# Patient Record
Sex: MALE | Race: WHITE | NOT HISPANIC OR LATINO
[De-identification: names, ages, dates, MRNs, and addresses within clinical notes are randomized per-mention and may not be internally consistent; named-entity substitution may affect disease eponyms.]

---

## 2021-07-19 PROBLEM — Z00.00 ENCOUNTER FOR PREVENTIVE HEALTH EXAMINATION: Status: ACTIVE | Noted: 2021-07-19

## 2021-08-25 ENCOUNTER — NON-APPOINTMENT (OUTPATIENT)
Age: 61
End: 2021-08-25

## 2021-08-25 ENCOUNTER — LABORATORY RESULT (OUTPATIENT)
Age: 61
End: 2021-08-25

## 2021-08-25 ENCOUNTER — APPOINTMENT (OUTPATIENT)
Dept: TRANSPLANT | Facility: CLINIC | Age: 61
End: 2021-08-25
Payer: COMMERCIAL

## 2021-08-25 ENCOUNTER — APPOINTMENT (OUTPATIENT)
Dept: NEPHROLOGY | Facility: CLINIC | Age: 61
End: 2021-08-25
Payer: COMMERCIAL

## 2021-08-25 VITALS
SYSTOLIC BLOOD PRESSURE: 128 MMHG | OXYGEN SATURATION: 96 % | HEIGHT: 76 IN | DIASTOLIC BLOOD PRESSURE: 80 MMHG | HEART RATE: 78 BPM | BODY MASS INDEX: 28.01 KG/M2 | WEIGHT: 230 LBS | TEMPERATURE: 98 F

## 2021-08-25 PROCEDURE — 99001T: CUSTOM

## 2021-08-25 PROCEDURE — 81382T: CUSTOM

## 2021-08-25 PROCEDURE — 99072 ADDL SUPL MATRL&STAF TM PHE: CPT

## 2021-08-25 PROCEDURE — 99205 OFFICE O/P NEW HI 60 MIN: CPT

## 2021-08-25 PROCEDURE — 86833 HLA CLASS II HIGH DEFIN QUAL: CPT

## 2021-08-25 PROCEDURE — 99204 OFFICE O/P NEW MOD 45 MIN: CPT

## 2021-08-25 PROCEDURE — 86832 HLA CLASS I HIGH DEFIN QUAL: CPT

## 2021-08-25 RX ORDER — SEVELAMER CARBONATE 800 MG/1
800 TABLET, FILM COATED ORAL
Qty: 90 | Refills: 0 | Status: ACTIVE | COMMUNITY
Start: 2021-08-25

## 2021-08-25 RX ORDER — METOPROLOL SUCCINATE 50 MG/1
50 TABLET, EXTENDED RELEASE ORAL
Qty: 60 | Refills: 0 | Status: ACTIVE | COMMUNITY
Start: 2021-08-25

## 2021-08-25 RX ORDER — GABAPENTIN 100 MG/1
100 CAPSULE ORAL
Qty: 30 | Refills: 1 | Status: ACTIVE | COMMUNITY
Start: 2021-08-25

## 2021-08-25 RX ORDER — APIXABAN 5 MG/1
5 TABLET, FILM COATED ORAL
Qty: 60 | Refills: 11 | Status: ACTIVE | COMMUNITY
Start: 2021-08-25

## 2021-08-25 NOTE — CONSULT LETTER
[Dear  ___] : Dear  [unfilled], [Consult Letter:] : I had the pleasure of evaluating your patient, [unfilled]. [Please see my note below.] : Please see my note below. [Sincerely,] : Sincerely, [FreeTextEntry3] : Derick [FreeTextEntry2] : Jim Dill MD

## 2021-08-25 NOTE — HISTORY OF PRESENT ILLNESS
[Diabetes Mellitus] : Diabetes Mellitus [Working] : Working [TextBox_42] : Mr. López is a 61 y.o male with ESRD related to diabetes and Covid19 infection.  His nephrologist is Dr. Dill.  Pt was admitted with Covid 2021.  he was not intubated.  He has fully recovered and has been vaccinated.  \par May have a history of CHF but never hospitalized with pulm edema.  He has a private cardiologist.  He has never had an MI or stent.  \par Pt has had DM2 since .  Pt used to be on insulin but no longer.  He is no longer on any medications.  He also lost a lot of weight (40Lbs) after hospitalization.  Pt has mild retinopathy and cataracts.  Pt has neuropathy of his feet.  \par Pt still urinates about 3-4 times daily.  \par \par Social history:  Pt works in [employer] - on his feet all day.  No alcohol use.  No smoking, used to smoke in his 30's.  Pt is  and has 4 children.  Lives with his wife and 2 children. \par Surgical history: Left wrist AV fistula, Left inguinal hernia repair, cholecystectomy, Dialysis catheter placement\par Family history:  Father:  - age 96 - ESKD, LDKTx (from son, brother of patient) in [place]\par Mother:  - age 84 - lung cancer, smoker.  has 4 children whom are healthy.  Brother donated a kidney.  Sisters have health problems.  \par \par Current Medications:\par Gabapentin \par Eliquis 5mg BID since 2021 (taking for irregular heart beat - ? atrial fibrillation)\par Metoprolol 50 mg BID\par Sevelmer 800 mg 1 TID with meals

## 2021-08-25 NOTE — PHYSICAL EXAM
PAST MEDICAL HISTORY:  Mandibular hyperplasia     Maxillary hypoplasia      [Well Developed] : well developed [Well Nourished] : well nourished [No Acute Distress] : no acute distress [Normocephalic] : normocephalic [Atraumatic] : atraumatic [Sclera Anicteric] : sclera anicteric [Good Dentition] : good dentition [Neck Supple] : neck supple [Clear to Auscultation] : clear to auscultation [Breathing Comfortably on RA] : breathing comfortably on room air [Normal Rate] : normal rate [Regular Rhythm] : regular rhythm [Murmur] : murmur [Systolic Murmur] : systolic murmur [Soft] : soft [Non-tender] : non-tender [In Left Forearm] : fistula/graft in left forearm [] : left dorsalis pedis palpable [Alert] : alert [Responds to Questions Appropriately] : responds to questions appropriately [Oriented] : oriented [Appropriate] : appropriate [FreeTextEntry1] : No carotid bruits [TextBox_34] : No ulcers or edema [TextBox_86] : No adenopathy

## 2021-08-25 NOTE — PLAN
[We should proceed with our protocol for kidney transplantation evaluation.] : We should proceed with our protocol for kidney transplantation evaluation. [TextBox_6] : Chest CT - history of smoking in the past\par CT of aorta and iliacs with IV contrast - diabetic, former smoker

## 2021-08-25 NOTE — CONSULT LETTER
[Dear  ___] : Dear  [unfilled], [Consult Letter:] : I had the pleasure of evaluating your patient, [unfilled]. [Please see my note below.] : Please see my note below. [Consult Closing:] : Thank you very much for allowing me to participate in the care of this patient.  If you have any questions, please do not hesitate to contact me. [Sincerely,] : Sincerely, [FreeTextEntry3] : Catrachito Kowalski, DO

## 2021-08-25 NOTE — PHYSICAL EXAM
[General Appearance - Alert] : alert [General Appearance - In No Acute Distress] : in no acute distress [Sclera] : the sclera and conjunctiva were normal [Extraocular Movements] : extraocular movements were intact [Neck Appearance] : the appearance of the neck was normal [Neck Cervical Mass (___cm)] : no neck mass was observed [Respiration, Rhythm And Depth] : normal respiratory rhythm and effort [Exaggerated Use Of Accessory Muscles For Inspiration] : no accessory muscle use [Auscultation Breath Sounds / Voice Sounds] : lungs were clear to auscultation bilaterally [Heart Rate And Rhythm] : heart rate was normal and rhythm regular [Heart Sounds] : normal S1 and S2 [Heart Sounds Gallop] : no gallops [Murmurs] : no murmurs [Full Pulse] : the pedal pulses are present [Edema] : there was no peripheral edema [Bowel Sounds] : normal bowel sounds [Abdomen Soft] : soft [Abdomen Tenderness] : non-tender [Cervical Lymph Nodes Enlarged Posterior Bilaterally] : posterior cervical [Cervical Lymph Nodes Enlarged Anterior Bilaterally] : anterior cervical [Supraclavicular Lymph Nodes Enlarged Bilaterally] : supraclavicular [Abnormal Walk] : normal gait [Musculoskeletal - Swelling] : no joint swelling seen [Skin Color & Pigmentation] : normal skin color and pigmentation [Skin Turgor] : normal skin turgor [] : no rash [Cranial Nerves] : cranial nerves 2-12 were intact [No Focal Deficits] : no focal deficits [Oriented To Time, Place, And Person] : oriented to person, place, and time [Impaired Insight] : insight and judgment were intact [Affect] : the affect was normal

## 2021-08-25 NOTE — HISTORY OF PRESENT ILLNESS
[Diabetes Mellitus] : Diabetes Mellitus [Other: ___] : [unfilled] [Cardiac History] : cardiac history [Diabetes] : diabetes [Retinopathy] : retinopathy [Neuropathy] : neuropathy [Annual Foot Exams] : annual foot exams [Insulin] : insulin treatment [Working] : Working [Previous Kidney Transplant] : no previous kidney transplant [Blood Transfusion] : no prior blood transfusion [Hx of DVT/Thrombosis/Miscarriage] : no history of dvt/thrombosis/miscarriage [Lower Extremity Ulcers] : no lower extremity ulcers [TextBox_16] : 2021 [TextBox_20] : 2010 [TextBox_24] : 2010 [TextBox_28] : 2000 [TextBox_30] : 1 mile [TextBox_39] : 2010-present [FreeTextEntry3] : 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  Prior to that 1/2 ppd x 24 years\par Drinking: denies\par Potential live donors: will ask

## 2021-08-25 NOTE — REASON FOR VISIT
[Initial] : an initial visit for [Kidney Transplant Evaluation] : kidney transplant evaluation [FreeTextEntry2] : Jim Dill MD

## 2021-08-25 NOTE — PLAN
[FreeTextEntry1] : 1.  ESRD - Mr. López is a reasonable candidate for kidney transplantation.  He will discuss living donation with his sisters and children.  \par 2.  DM2 - currently not on any medications.  Discussed that his blood sugars will rise after transplantation. \par 3.  HTN - stable\par 4.  CV - No cardiac history.  Possible CHF by patient report.  Will need stress test and echocardiogram. \par 5.  Cancer screening - will check/ obtain colonoscopy, PSA, CXR and CT of a/p\par 6.  Hx Covid19 - no sequela.  Has been vaccinated. \par \par I have personally discussed the risks and benefits of transplantation and patient attended transplant education class where the following was disclosed:\par  \par Reviewed factors affecting survival and morbidity while on dialysis, the transplant wait list and reviewed cheryl-operative and long-term risk factors affecting outcome in kidney transplantation.  \par  \par One year SRTR outcomes for national and [facility] were discussed in regards to patient survival and graft survival after transplantation.  \par  \par Details of transplant surgery, including complications were discussed.\par Immunosuppression and complications including infection including life threatening sepsis and opportunistic infections, malignancy and new onset diabetes were discussed.  \par  \par Benefits of live donor transplantation as well as variability in wait times across regions and multiple listing were discussed. \par KDPI >85% and PHS high risk criteria donors were discussed. \par HCV kidney transplantation was discussed.\par  \par Will proceed with completing/ updating work up and listing for transplant/ live donor transplant once work up is reviewed and found to be acceptable by multidisciplinary listing committee.\par \par

## 2021-08-25 NOTE — REVIEW OF SYSTEMS
[Fever] : no fever [Chills] : no chills [Fatigue] : fatigue [Night Sweats] : no night sweats [Recent Weight Gain (___ Lbs)] : no recent weight gain [Recent Weight Loss (___ Lbs)] : recent [unfilled] ~Ulb weight loss [Sore throat] : no sore throat [Pain/Stiffness] : no pain/stiffness [Rhinorrhea] : no rhinorrhea [Trauma] : no trauma [Sclera anicteric] : sclera icteric [Double vision] : no double vision [Blurred Vision] : blurred vision [Eye Pain] : no eye pain [Wears glasses] : wears glasses [Chest Pain] : no chest pain [Palpitations] : no palpitations [Can Walk (___ Blocks)] : can walk [unfilled] blocks [SOB] : no shortness of breath [Wheezing] : no wheezing [Cough] : no cough [Dyspnea on Exertion] : no dyspnea on exertion [Pleuritic Chest Pain] : no pleuritic chest pain [Abdominal Pain] : no abdominal pain [Nausea] : no nausea [Constipation] : no constipation [Diarrhea] : diarrhea [Vomiting] : no vomiting [Dysuria] : no dysuria [Frequency] : no frequency [Urgency] : no urinary urgency [Incontinence] : no incontinence [Hematuria] : no hematuria [UTI] : no UTI [Urine Output: ____] : Urine Output: [unfilled] [Joint Pain] : no joint pain [Joint Stiffness] : no joint stiffness [Muscle Pain] : no muscle pain [Muscle Weakness] : no muscle weakness [Tattoos] : no tattoos [Itching] : no itching [Skin Rash] : skin rash [Hair Changes] : hair changes [Headache] : no headache [Dizziness] : no dizziness [Fainting] : no fainting [Confusion] : no confusion [Memory Loss] : no memory loss [Unsteady Gait] : steady gait [Seizures] : no seizures [Hallucinations] : no hallucinations [Anxiety] : no anxiety [Depression] : no depression [Anemia] : anemia [Adenopathy] : no adenopathy [Easy Bleeding] : no easy bleeding [Easy Bruising] : no easy bruising [de-identified] : cataracts [de-identified] : hair loss

## 2021-08-30 LAB
ABO + RH PNL BLD: NORMAL
ABO + RH PNL BLD: NORMAL
ALBUMIN SERPL ELPH-MCNC: 4.6 G/DL
ALP BLD-CCNC: 251 U/L
ALT SERPL-CCNC: 9 U/L
ANION GAP SERPL CALC-SCNC: 16 MMOL/L
AST SERPL-CCNC: 14 U/L
BASOPHILS # BLD AUTO: 0.03 K/UL
BASOPHILS NFR BLD AUTO: 0.5 %
BILIRUB SERPL-MCNC: 0.2 MG/DL
BUN SERPL-MCNC: 44 MG/DL
CALCIUM SERPL-MCNC: 9.9 MG/DL
CHLORIDE SERPL-SCNC: 100 MMOL/L
CHOLEST SERPL-MCNC: 150 MG/DL
CMV IGG SERPL QL: <0.2 U/ML
CMV IGG SERPL-IMP: NEGATIVE
CO2 SERPL-SCNC: 25 MMOL/L
COVID-19 NUCLEOCAPSID  GAM ANTIBODY INTERPRETATION: POSITIVE
COVID-19 SPIKE DOMAIN ANTIBODY INTERPRETATION: POSITIVE
CREAT SERPL-MCNC: 7.13 MG/DL
EBV DNA SERPL NAA+PROBE-ACNC: NOT DETECTED IU/ML
EBV EA AB SER IA-ACNC: <5 U/ML
EBV EA AB TITR SER IF: POSITIVE
EBV EA IGG SER QL IA: >600 U/ML
EBV EA IGG SER-ACNC: NEGATIVE
EBV EA IGM SER IA-ACNC: NEGATIVE
EBV PATRN SPEC IB-IMP: NORMAL
EBV VCA IGG SER IA-ACNC: >750 U/ML
EBV VCA IGM SER QL IA: <10 U/ML
EOSINOPHIL # BLD AUTO: 0.13 K/UL
EOSINOPHIL NFR BLD AUTO: 2.1 %
EPSTEIN-BARR VIRUS CAPSID ANTIGEN IGG: POSITIVE
ESTIMATED AVERAGE GLUCOSE: 160 MG/DL
GLUCOSE SERPL-MCNC: 110 MG/DL
HAV IGM SER QL: NONREACTIVE
HBA1C MFR BLD HPLC: 7.2 %
HBV CORE IGG+IGM SER QL: NONREACTIVE
HBV SURFACE AB SER QL: NONREACTIVE
HBV SURFACE AB SERPL IA-ACNC: 4.7 MIU/ML
HBV SURFACE AG SER QL: NONREACTIVE
HCT VFR BLD CALC: 39.1 %
HCV AB SER QL: NONREACTIVE
HCV S/CO RATIO: 0.15 S/CO
HDLC SERPL-MCNC: 43 MG/DL
HEPATITIS A IGG ANTIBODY: NONREACTIVE
HGB BLD-MCNC: 11.8 G/DL
HIV1+2 AB SPEC QL IA.RAPID: NONREACTIVE
HSV 1+2 IGG SER IA-IMP: POSITIVE
HSV 1+2 IGG SER IA-IMP: POSITIVE
HSV1 IGG SER QL: 3.12 INDEX
HSV2 IGG SER QL: 6.54 INDEX
IMM GRANULOCYTES NFR BLD AUTO: 0.3 %
LDLC SERPL CALC-MCNC: 71 MG/DL
LYMPHOCYTES # BLD AUTO: 2.02 K/UL
LYMPHOCYTES NFR BLD AUTO: 32.7 %
M TB IFN-G BLD-IMP: NEGATIVE
MAGNESIUM SERPL-MCNC: 2.5 MG/DL
MAN DIFF?: NORMAL
MCHC RBC-ENTMCNC: 22.3 PG
MCHC RBC-ENTMCNC: 30.2 GM/DL
MCV RBC AUTO: 73.8 FL
MONOCYTES # BLD AUTO: 0.59 K/UL
MONOCYTES NFR BLD AUTO: 9.6 %
NEUTROPHILS # BLD AUTO: 3.38 K/UL
NEUTROPHILS NFR BLD AUTO: 54.8 %
NONHDLC SERPL-MCNC: 107 MG/DL
PHOSPHATE SERPL-MCNC: 5.6 MG/DL
PLATELET # BLD AUTO: 315 K/UL
POTASSIUM SERPL-SCNC: 5.5 MMOL/L
PROT SERPL-MCNC: 7.5 G/DL
PSA SERPL-MCNC: 0.9 NG/ML
QUANTIFERON TB PLUS MITOGEN MINUS NIL: 9.16 IU/ML
QUANTIFERON TB PLUS NIL: 0.03 IU/ML
QUANTIFERON TB PLUS TB1 MINUS NIL: 0 IU/ML
QUANTIFERON TB PLUS TB2 MINUS NIL: 0 IU/ML
RBC # BLD: 5.3 M/UL
RBC # FLD: 20.8 %
ROGOSIN: NORMAL
RUBV IGG FLD-ACNC: 4.3 INDEX
RUBV IGG SER-IMP: POSITIVE
SARS-COV-2 AB SERPL IA-ACNC: >250 U/ML
SARS-COV-2 AB SERPL QL IA: 63.4 INDEX
SODIUM SERPL-SCNC: 141 MMOL/L
T GONDII AB SER-IMP: NEGATIVE
T GONDII IGG SER QL: <3 IU/ML
T PALLIDUM AB SER QL IA: NEGATIVE
TRIGL SERPL-MCNC: 183 MG/DL
VZV AB TITR SER: POSITIVE
VZV IGG SER IF-ACNC: 3461 INDEX
WBC # FLD AUTO: 6.17 K/UL

## 2021-10-04 ENCOUNTER — APPOINTMENT (OUTPATIENT)
Dept: CT IMAGING | Facility: CLINIC | Age: 61
End: 2021-10-04
Payer: COMMERCIAL

## 2021-10-04 ENCOUNTER — OUTPATIENT (OUTPATIENT)
Dept: OUTPATIENT SERVICES | Facility: HOSPITAL | Age: 61
LOS: 1 days | End: 2021-10-04
Payer: COMMERCIAL

## 2021-10-04 DIAGNOSIS — Z01.818 ENCOUNTER FOR OTHER PREPROCEDURAL EXAMINATION: ICD-10-CM

## 2021-10-04 PROCEDURE — 74177 CT ABD & PELVIS W/CONTRAST: CPT

## 2021-10-04 PROCEDURE — 74177 CT ABD & PELVIS W/CONTRAST: CPT | Mod: 26

## 2021-10-04 PROCEDURE — 71260 CT THORAX DX C+: CPT | Mod: 26

## 2021-10-04 PROCEDURE — 71260 CT THORAX DX C+: CPT

## 2021-10-06 ENCOUNTER — NON-APPOINTMENT (OUTPATIENT)
Age: 61
End: 2021-10-06

## 2021-10-06 ENCOUNTER — APPOINTMENT (OUTPATIENT)
Dept: CARDIOLOGY | Facility: CLINIC | Age: 61
End: 2021-10-06
Payer: COMMERCIAL

## 2021-10-06 VITALS
WEIGHT: 233 LBS | HEART RATE: 72 BPM | OXYGEN SATURATION: 98 % | DIASTOLIC BLOOD PRESSURE: 78 MMHG | SYSTOLIC BLOOD PRESSURE: 130 MMHG | BODY MASS INDEX: 28.37 KG/M2 | HEIGHT: 76 IN

## 2021-10-06 DIAGNOSIS — Z87.891 PERSONAL HISTORY OF NICOTINE DEPENDENCE: ICD-10-CM

## 2021-10-06 DIAGNOSIS — Z87.898 PERSONAL HISTORY OF OTHER SPECIFIED CONDITIONS: ICD-10-CM

## 2021-10-06 PROCEDURE — 99205 OFFICE O/P NEW HI 60 MIN: CPT

## 2021-10-06 PROCEDURE — 99072 ADDL SUPL MATRL&STAF TM PHE: CPT

## 2021-10-06 PROCEDURE — 93000 ELECTROCARDIOGRAM COMPLETE: CPT | Mod: NC

## 2021-10-15 NOTE — HISTORY OF PRESENT ILLNESS
[FreeTextEntry1] : Patient is a 61 year-old Black gentleman with known history of hypertension, insulin dependent type II diabetes (no longer insulin dependent, since starting HD), ESRD on HD via right radial AV fistula (Mhzzxdz-Nvxcyrjk-Fjtrrcpi) since January 2021, when he was hospitalized with Covid-19 infection, paroxysmal atrial fibrillation, maintained on Eliquis 5 mg BID, presents today for cardiac evaluation prior to possible renal transplant.\par After being hospitalized with Covid-19, he has received both doses of Moderna's Covid-19 vaccine.\par \par Patient does not exercise, but he hopes to start.\par \par He reports diabetic neuropathy in the feet.\par He does not have symptoms of carpal tunnel syndrome.\par \par

## 2021-10-15 NOTE — PHYSICAL EXAM
[Well Developed] : well developed [Well Nourished] : well nourished [No Acute Distress] : no acute distress [Normal Conjunctiva] : normal conjunctiva [Normal Venous Pressure] : normal venous pressure [No Carotid Bruit] : no carotid bruit [Normal S1, S2] : normal S1, S2 [No Murmur] : no murmur [No Rub] : no rub [No Gallop] : no gallop [Clear Lung Fields] : clear lung fields [Good Air Entry] : good air entry [No Respiratory Distress] : no respiratory distress  [Soft] : abdomen soft [Non Tender] : non-tender [No Masses/organomegaly] : no masses/organomegaly [Normal Bowel Sounds] : normal bowel sounds [Normal Gait] : normal gait [No Edema] : no edema [No Cyanosis] : no cyanosis [No Clubbing] : no clubbing [No Varicosities] : no varicosities [No Rash] : no rash [No Skin Lesions] : no skin lesions [Moves all extremities] : moves all extremities [No Focal Deficits] : no focal deficits [Normal Speech] : normal speech [Alert and Oriented] : alert and oriented [Normal memory] : normal memory [de-identified] : right radial AV fistula, +thrill

## 2021-10-15 NOTE — CARDIOLOGY SUMMARY
[de-identified] : 10/6/2021, sinus 69 bpm, first degree AV delay (aSllie 238 msec), nonspecific IVCD, early repolarization

## 2021-10-15 NOTE — DISCUSSION/SUMMARY
[FreeTextEntry1] : Patient is a 61 year-old Black gentleman with history as above who is being evaluated for renal transplant.\par \par Echocardiogram to evaluate for structural heart disease.\par Nuclear stress test to evaluate for ischemic heart disease.\par \par Encouraged walking or using his son's bicycle for exercise.

## 2021-10-18 ENCOUNTER — APPOINTMENT (OUTPATIENT)
Dept: CARDIOLOGY | Facility: CLINIC | Age: 61
End: 2021-10-18
Payer: COMMERCIAL

## 2021-10-18 PROCEDURE — A9500: CPT

## 2021-10-18 PROCEDURE — 99072 ADDL SUPL MATRL&STAF TM PHE: CPT

## 2021-10-18 PROCEDURE — 93015 CV STRESS TEST SUPVJ I&R: CPT

## 2021-10-18 PROCEDURE — 78452 HT MUSCLE IMAGE SPECT MULT: CPT

## 2021-10-18 PROCEDURE — 93306 TTE W/DOPPLER COMPLETE: CPT

## 2021-10-26 ENCOUNTER — NON-APPOINTMENT (OUTPATIENT)
Age: 61
End: 2021-10-26

## 2022-01-06 ENCOUNTER — NON-APPOINTMENT (OUTPATIENT)
Age: 62
End: 2022-01-06

## 2022-02-16 ENCOUNTER — APPOINTMENT (OUTPATIENT)
Dept: TRANSPLANT | Facility: CLINIC | Age: 62
End: 2022-02-16
Payer: COMMERCIAL

## 2022-02-16 VITALS
TEMPERATURE: 97.6 F | HEART RATE: 84 BPM | BODY MASS INDEX: 27.76 KG/M2 | SYSTOLIC BLOOD PRESSURE: 130 MMHG | OXYGEN SATURATION: 97 % | DIASTOLIC BLOOD PRESSURE: 76 MMHG | WEIGHT: 228 LBS | HEIGHT: 76 IN | RESPIRATION RATE: 14 BRPM

## 2022-02-16 PROCEDURE — 99215 OFFICE O/P EST HI 40 MIN: CPT

## 2022-02-16 PROCEDURE — 99072 ADDL SUPL MATRL&STAF TM PHE: CPT

## 2022-02-16 NOTE — PHYSICAL EXAM
[Well Developed] : well developed [Well Nourished] : well nourished [No Acute Distress] : no acute distress [Normocephalic] : normocephalic [Atraumatic] : atraumatic [Sclera Anicteric] : sclera anicteric [Good Dentition] : good dentition [Neck Supple] : neck supple [Clear to Auscultation] : clear to auscultation [Breathing Comfortably on RA] : breathing comfortably on room air [Normal Rate] : normal rate [Regular Rhythm] : regular rhythm [Murmur] : murmur [Systolic Murmur] : systolic murmur [Soft] : soft [Non-tender] : non-tender [] : right dorsalis pedis palpable [Alert] : alert [Responds to Questions Appropriately] : responds to questions appropriately [Oriented] : oriented [Appropriate] : appropriate [In Right Forearm] : fistula/graft in right forearm [FreeTextEntry1] : No carotid bruits [TextBox_34] : No ulcers or edema [TextBox_86] : No adenopathy

## 2022-02-16 NOTE — REASON FOR VISIT
[Follow-Up] : a follow-up visit for [Kidney Transplant Evaluation] : kidney transplant evaluation [FreeTextEntry2] : Jim Dill MD

## 2022-02-16 NOTE — REVIEW OF SYSTEMS
[Recent Weight Loss (___ Lbs)] : recent [unfilled] ~Ulb weight loss [Blurred Vision] : blurred vision [Wears glasses] : wears glasses [Can Walk (___ Blocks)] : can walk [unfilled] blocks [Urine Output: ____] : Urine Output: [unfilled] [Anemia] : anemia [Fever] : no fever [Chills] : no chills [Fatigue] : no fatigue [Night Sweats] : no night sweats [Recent Weight Gain (___ Lbs)] : no recent weight gain [Sore throat] : no sore throat [Pain/Stiffness] : no pain/stiffness [Rhinorrhea] : no rhinorrhea [Trauma] : no trauma [Sclera anicteric] : sclera icteric [Double vision] : no double vision [Eye Pain] : no eye pain [Chest Pain] : no chest pain [Palpitations] : no palpitations [SOB] : no shortness of breath [Wheezing] : no wheezing [Cough] : no cough [Dyspnea on Exertion] : no dyspnea on exertion [Pleuritic Chest Pain] : no pleuritic chest pain [Abdominal Pain] : no abdominal pain [Nausea] : no nausea [Constipation] : no constipation [Diarrhea] : diarrhea [Vomiting] : no vomiting [Dysuria] : no dysuria [Frequency] : no frequency [Urgency] : no urinary urgency [Incontinence] : no incontinence [Hematuria] : no hematuria [UTI] : no UTI [Joint Pain] : no joint pain [Joint Stiffness] : no joint stiffness [Muscle Pain] : no muscle pain [Muscle Weakness] : no muscle weakness [Tattoos] : no tattoos [Itching] : no itching [Skin Rash] : no skin rash [Hair Changes] : no hair changes [Headache] : no headache [Dizziness] : no dizziness [Fainting] : no fainting [Confusion] : no confusion [Memory Loss] : no memory loss [Unsteady Gait] : steady gait [Seizures] : no seizures [Hallucinations] : no hallucinations [Anxiety] : no anxiety [Depression] : no depression [Adenopathy] : no adenopathy [Easy Bleeding] : no easy bleeding [Easy Bruising] : no easy bruising [de-identified] : cataracts

## 2022-02-16 NOTE — HISTORY OF PRESENT ILLNESS
[TextBox_42] : Cause of Kidney Failure: Diabetes Mellitus, COVID \par Date Dialysis Started:  \par Date of Kidney Failure Diagnosis:  \par Date of Hypertension Diagnosis:  \par Date of Diabetes Diagnosis:  \par Medical History: cardiac history, diabetes, but no previous kidney transplant, no prior blood transfusion, no history of dvt/thrombosis/miscarriage \par Distance Able to Walk: 1 mile. 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 Prior to that 1/2 ppd x 24 years\par Drinking: denies\par Potential live donors: none at present \par

## 2022-02-16 NOTE — CONSULT LETTER
[Dear  ___] : Dear  [unfilled], [Consult Letter:] : I had the pleasure of evaluating your patient, [unfilled]. [Please see my note below.] : Please see my note below. [Consult Closing:] : Thank you very much for allowing me to participate in the care of this patient.  If you have any questions, please do not hesitate to contact me. [Sincerely,] : Sincerely, [FreeTextEntry2] : Jim Dill MD [FreeTextEntry3] : Derick

## 2022-02-17 LAB
COVID-19 SPIKE DOMAIN ANTIBODY INTERPRETATION: POSITIVE
SARS-COV-2 AB SERPL IA-ACNC: >250 U/ML
SARS-COV-2 N GENE NPH QL NAA+PROBE: NOT DETECTED

## 2022-02-18 ENCOUNTER — NON-APPOINTMENT (OUTPATIENT)
Age: 62
End: 2022-02-18

## 2022-05-17 ENCOUNTER — NON-APPOINTMENT (OUTPATIENT)
Age: 62
End: 2022-05-17

## 2022-09-14 ENCOUNTER — LABORATORY RESULT (OUTPATIENT)
Age: 62
End: 2022-09-14

## 2022-09-14 ENCOUNTER — APPOINTMENT (OUTPATIENT)
Dept: NEPHROLOGY | Facility: CLINIC | Age: 62
End: 2022-09-14

## 2022-09-14 VITALS
RESPIRATION RATE: 14 BRPM | HEIGHT: 76 IN | TEMPERATURE: 98 F | WEIGHT: 232 LBS | SYSTOLIC BLOOD PRESSURE: 126 MMHG | HEART RATE: 87 BPM | BODY MASS INDEX: 28.25 KG/M2 | DIASTOLIC BLOOD PRESSURE: 79 MMHG | OXYGEN SATURATION: 99 %

## 2022-09-14 PROCEDURE — 99215 OFFICE O/P EST HI 40 MIN: CPT

## 2022-09-14 PROCEDURE — 99072 ADDL SUPL MATRL&STAF TM PHE: CPT

## 2022-09-14 NOTE — HISTORY OF PRESENT ILLNESS
[Diabetes Mellitus] : Diabetes Mellitus [Working] : Working [TextBox_42] : Mr. López is a 62 year old  male with ESRD related to diabetes and Covid19 infection. \par  His nephrologist is Dr. Dill. \par \par Pt was admitted with Covid 2021.  he was not intubated.  He has fully recovered and has been vaccinated.  \par No h/o MI or CAD\par Pt has had DM2 since .  Pt used to be on insulin but no longer.  He is no longer on any medications.  He also lost a lot of weight (40Lbs) after hospitalization.  Pt has mild retinopathy and cataracts.  Pt has neuropathy of his feet.  \par Pt still urinates about 3-4 times daily.  \par \par Social history:  Pt works in [employer] - on his feet all day.  No alcohol use.  No smoking, used to smoke in his 30's.  Pt is  and has 4 children.  Lives with his wife and 2 children. \par Surgical history: Left wrist AV fistula, Left inguinal hernia repair, cholecystectomy, Dialysis catheter placement\par Family history:  Father:  - age 96 - ESKD, LDKTx (from son, brother of patient) in [place]\par Mother:  - age 84 - lung cancer, smoker.  has 4 children whom are healthy.  Brother donated a kidney.  Sisters have health problems.  \par \par Current Medications:\par Gabapentin \par Eliquis 5mg BID since 2021 (taking for irregular heart beat - ? atrial fibrillation)\par Metoprolol 50 mg BID\par Sevelmer 800 mg 1 TID with meals\par \par \par Last Seen 2022\par Listed 22\par Dialysis \par ABO A\par PRA 29% will repeat today. \par \par Most recent testing.\par Cardiac- followed by Dr Carlton. \par On 10/18/2021, patient had his echo and nuclear stress test.\par Echo showed a dilated aortic root at 4.4 cm, but he is 6'4" tall. He was seen to have normal LV systolic function, LVEF 55-60%.\par During his nuclear stress test, he completed 5 minutes 30 seconds of Wili protocol (>6 METS), achieved 86% MPHR, and the myocardial perfusion imaging was negative for ischemia. \par \par No further cardiovascular testing is necessary prior to consideration for renal transplant. \par \par Radiology\par CT chest/abd/and pelvis with IV contrast 10/26/21 patent centra airways. thoracic aorta measureing 4.1 cm, cholecystectomy. atrophic kidneys. no hydro bilaterally. prostatomegaly 6.5 cm. other organs unremarkable. vasculature noted mild atherosclerotic change. patent abd arteries. mild calcifiev and noncalcified plaque of the bilateral common iliac and internal iliac arteries. \par \par Cancer Screening\par Colonoscopy 2022 internal hemorrhoids diverticulosis of colon. no gross lesions identified. \par PSA 21 0.9 \par \par No hospital admissions in this past one year. \par \par

## 2022-09-14 NOTE — PLAN
[FreeTextEntry1] : 1.  ESRD - Mr. López is a reasonable candidate for kidney transplantation.  \par 2.  DM2 - currently not on any medications. Discussed that his blood sugars will rise after transplantation. \par 3.  HTN - stable\par 4.  CV - needs an updated stress test and echocardiogram. \par 5.  Cancer screening -  colonoscopy was wnl. check  PSA\par 5. Imaging- update\par \par \par I have personally discussed the risks and benefits of transplantation and patient attended transplant education class where the following was disclosed:\par  \par Reviewed factors affecting survival and morbidity while on dialysis, the transplant wait list and reviewed cheryl-operative and long-term risk factors affecting outcome in kidney transplantation.  \par  \par One year SRTR outcomes for national and [facility] were discussed in regards to patient survival and graft survival after transplantation.  \par  \par Details of transplant surgery, including complications were discussed.\par Immunosuppression and complications including infection including life threatening sepsis and opportunistic infections, malignancy and new onset diabetes were discussed.  \par  \par Benefits of live donor transplantation as well as variability in wait times across regions and multiple listing were discussed. \par KDPI >85% and PHS high risk criteria donors were discussed. \par HCV kidney transplantation was discussed.\par  \par Will proceed with completing/ updating work up and listing for transplant/ live donor transplant once work up is reviewed and found to be acceptable by multidisciplinary listing committee.\par \par

## 2022-09-23 LAB
ABO + RH PNL BLD: NORMAL
ALBUMIN SERPL ELPH-MCNC: 4.9 G/DL
ALP BLD-CCNC: 239 U/L
ALT SERPL-CCNC: 9 U/L
ANION GAP SERPL CALC-SCNC: 23 MMOL/L
APPEARANCE: CLEAR
AST SERPL-CCNC: 11 U/L
BASOPHILS # BLD AUTO: 0.05 K/UL
BASOPHILS NFR BLD AUTO: 0.6 %
BILIRUB SERPL-MCNC: 0.3 MG/DL
BILIRUBIN URINE: NEGATIVE
BLOOD URINE: NORMAL
BUN SERPL-MCNC: 45 MG/DL
C PEPTIDE SERPL-MCNC: 11.8 NG/ML
CALCIUM SERPL-MCNC: 9.9 MG/DL
CHLORIDE SERPL-SCNC: 100 MMOL/L
CHOLEST SERPL-MCNC: 176 MG/DL
CK SERPL-CCNC: 100 U/L
CMV IGG SERPL QL: <0.2 U/ML
CMV IGG SERPL-IMP: NEGATIVE
CO2 SERPL-SCNC: 22 MMOL/L
COLOR: YELLOW
COVID-19 SPIKE DOMAIN ANTIBODY INTERPRETATION: POSITIVE
CREAT SERPL-MCNC: 7.82 MG/DL
CREAT SPEC-SCNC: 208 MG/DL
CREAT/PROT UR: 2.2 RATIO
CRP SERPL-MCNC: 4 MG/L
EBV EA AB SER IA-ACNC: <5 U/ML
EBV EA AB TITR SER IF: POSITIVE
EBV EA IGG SER QL IA: >600 U/ML
EBV EA IGG SER-ACNC: NEGATIVE
EBV EA IGM SER IA-ACNC: NEGATIVE
EBV PATRN SPEC IB-IMP: NORMAL
EBV VCA IGG SER IA-ACNC: >750 U/ML
EBV VCA IGM SER QL IA: <10 U/ML
EGFR: 7 ML/MIN/1.73M2
EOSINOPHIL # BLD AUTO: 0.26 K/UL
EOSINOPHIL NFR BLD AUTO: 3.3 %
EPSTEIN-BARR VIRUS CAPSID ANTIGEN IGG: POSITIVE
ERYTHROCYTE [SEDIMENTATION RATE] IN BLOOD BY WESTERGREN METHOD: 72 MM/HR
ESTIMATED AVERAGE GLUCOSE: 166 MG/DL
GLUCOSE QUALITATIVE U: ABNORMAL
GLUCOSE SERPL-MCNC: 244 MG/DL
HAV IGM SER QL: NONREACTIVE
HBA1C MFR BLD HPLC: 7.4 %
HBV CORE IGG+IGM SER QL: NONREACTIVE
HBV SURFACE AB SER QL: REACTIVE
HBV SURFACE AB SERPL IA-ACNC: 658.4 MIU/ML
HBV SURFACE AG SER QL: NONREACTIVE
HCT VFR BLD CALC: 36.2 %
HCV AB SER QL: NONREACTIVE
HCV S/CO RATIO: 0.13 S/CO
HDLC SERPL-MCNC: 40 MG/DL
HEPATITIS A IGG ANTIBODY: NONREACTIVE
HGB BLD-MCNC: 11.2 G/DL
HIV1+2 AB SPEC QL IA.RAPID: NONREACTIVE
HSV 1+2 IGG SER IA-IMP: POSITIVE
HSV1 IGG SER QL: 2.52 INDEX
HSV1 IGG SER QL: 2.52 INDEX
HSV1 IGM SER QL: NEGATIVE
HSV2 AB FLD-ACNC: NEGATIVE
HSV2 IGG SER QL: 5.31 INDEX
IMM GRANULOCYTES NFR BLD AUTO: 0.4 %
KETONES URINE: NEGATIVE
LDLC SERPL CALC-MCNC: 97 MG/DL
LEUKOCYTE ESTERASE URINE: NEGATIVE
LYMPHOCYTES # BLD AUTO: 2.27 K/UL
LYMPHOCYTES NFR BLD AUTO: 28.9 %
M TB IFN-G BLD-IMP: NEGATIVE
MAGNESIUM SERPL-MCNC: 2.2 MG/DL
MAN DIFF?: NORMAL
MCHC RBC-ENTMCNC: 22.4 PG
MCHC RBC-ENTMCNC: 30.9 GM/DL
MCV RBC AUTO: 72.5 FL
MONOCYTES # BLD AUTO: 0.58 K/UL
MONOCYTES NFR BLD AUTO: 7.4 %
NEUTROPHILS # BLD AUTO: 4.67 K/UL
NEUTROPHILS NFR BLD AUTO: 59.4 %
NITRITE URINE: NEGATIVE
NONHDLC SERPL-MCNC: 136 MG/DL
PH URINE: 6.5
PHOSPHATE SERPL-MCNC: 5.1 MG/DL
PLATELET # BLD AUTO: 280 K/UL
POTASSIUM SERPL-SCNC: 4.5 MMOL/L
PROT SERPL-MCNC: 7.6 G/DL
PROT UR-MCNC: 455 MG/DL
PROTEIN URINE: ABNORMAL
PSA SERPL-MCNC: 1.16 NG/ML
QUANTIFERON TB PLUS MITOGEN MINUS NIL: >10 IU/ML
QUANTIFERON TB PLUS NIL: 0.03 IU/ML
QUANTIFERON TB PLUS TB1 MINUS NIL: 0.02 IU/ML
QUANTIFERON TB PLUS TB2 MINUS NIL: 0 IU/ML
RBC # BLD: 4.99 M/UL
RBC # FLD: 19.3 %
RUBV IGG FLD-ACNC: 2.7 INDEX
RUBV IGG SER-IMP: POSITIVE
SARS-COV-2 AB SERPL IA-ACNC: >250 U/ML
SARS-COV-2 N GENE NPH QL NAA+PROBE: NOT DETECTED
SODIUM SERPL-SCNC: 144 MMOL/L
SPECIFIC GRAVITY URINE: 1.02
T GONDII AB SER-IMP: NEGATIVE
T GONDII IGG SER QL: <3 IU/ML
T PALLIDUM AB SER QL IA: NEGATIVE
T3 SERPL-MCNC: 126 NG/DL
T4 FREE SERPL-MCNC: 1 NG/DL
TRIGL SERPL-MCNC: 195 MG/DL
TSH SERPL-ACNC: 2.82 UIU/ML
URATE SERPL-MCNC: 4.8 MG/DL
UROBILINOGEN URINE: NORMAL
VZV AB TITR SER: POSITIVE
VZV IGG SER IF-ACNC: 1386 INDEX
WBC # FLD AUTO: 7.86 K/UL

## 2022-10-20 ENCOUNTER — APPOINTMENT (OUTPATIENT)
Dept: CARDIOLOGY | Facility: CLINIC | Age: 62
End: 2022-10-20

## 2022-10-20 ENCOUNTER — NON-APPOINTMENT (OUTPATIENT)
Age: 62
End: 2022-10-20

## 2022-10-20 VITALS
WEIGHT: 236 LBS | OXYGEN SATURATION: 97 % | DIASTOLIC BLOOD PRESSURE: 82 MMHG | SYSTOLIC BLOOD PRESSURE: 132 MMHG | HEIGHT: 76 IN | HEART RATE: 73 BPM | BODY MASS INDEX: 28.74 KG/M2

## 2022-10-20 PROCEDURE — 99215 OFFICE O/P EST HI 40 MIN: CPT

## 2022-10-20 PROCEDURE — 99072 ADDL SUPL MATRL&STAF TM PHE: CPT

## 2022-10-20 PROCEDURE — 93000 ELECTROCARDIOGRAM COMPLETE: CPT | Mod: NC

## 2022-10-21 NOTE — PHYSICAL EXAM
[Well Developed] : well developed [Well Nourished] : well nourished [No Acute Distress] : no acute distress [Normal Conjunctiva] : normal conjunctiva [Normal Venous Pressure] : normal venous pressure [No Carotid Bruit] : no carotid bruit [Normal S1, S2] : normal S1, S2 [No Murmur] : no murmur [No Rub] : no rub [No Gallop] : no gallop [Clear Lung Fields] : clear lung fields [Good Air Entry] : good air entry [No Respiratory Distress] : no respiratory distress  [Soft] : abdomen soft [Non Tender] : non-tender [No Masses/organomegaly] : no masses/organomegaly [Normal Bowel Sounds] : normal bowel sounds [Normal Gait] : normal gait [No Edema] : no edema [No Cyanosis] : no cyanosis [No Clubbing] : no clubbing [No Varicosities] : no varicosities [No Rash] : no rash [No Skin Lesions] : no skin lesions [Moves all extremities] : moves all extremities [No Focal Deficits] : no focal deficits [Normal Speech] : normal speech [Alert and Oriented] : alert and oriented [Normal memory] : normal memory [de-identified] : right radial AV fistula, +thrill

## 2022-10-21 NOTE — HISTORY OF PRESENT ILLNESS
[FreeTextEntry1] : Patient is a 61 year-old Black gentleman with known history of hypertension, insulin dependent type II diabetes (no longer insulin dependent, since starting HD), ESRD on HD via right radial AV fistula (Vinqaje-Phtivuke-Zgypeemh) since January 2021, when he was hospitalized with Covid-19 infection, paroxysmal atrial fibrillation, maintained on Eliquis 5 mg BID, presents today for cardiac evaluation prior to possible renal transplant.\par After being hospitalized with Covid-19, he has received both doses of Moderna's Covid-19 vaccine.\par \par Patient does not exercise, but he hopes to start.\par \par He reports diabetic neuropathy in the feet.\par He does not have symptoms of carpal tunnel syndrome.\par \par

## 2022-10-21 NOTE — DISCUSSION/SUMMARY
[EKG obtained to assist in diagnosis and management of assessed problem(s)] : EKG obtained to assist in diagnosis and management of assessed problem(s) [FreeTextEntry1] : Patient is a 62 year-old Black gentleman with history as above who is being evaluated for renal transplant.\par Echocardiogram and nuclear stress testing in October 2021 were within normal limits and negative for ischemic heart disease. Dilated aortic root was noted, but it may be normal when adjusted for his height. \par \par Echocardiogram to evaluate for structural heart disease.\par Nuclear stress test to evaluate for ischemic heart disease.\par \par Encouraged walking or using his son's bicycle for exercise.

## 2022-10-21 NOTE — REASON FOR VISIT
[Other: ____] : [unfilled] [FreeTextEntry1] : October 2022 - Patient returns today for follow-up in his usual state of health. He reports feeling tired all the time. He attributes this to taking his daughter to her job (at [email or web address]) at 5 in the morning, and then taking his wife to work at 9am).\par Continues to have HD via right radial AV fistula (Hftijin-Rdniwcxm-Qiezvciv).\par He does not exercise. \par He has not had Covid-19 infection again since his January 2021 infection. He received two vaccines and a booster, but he has not received the bivalent booster.

## 2022-10-21 NOTE — CARDIOLOGY SUMMARY
[de-identified] : 10/6/2021, sinus 69 bpm, first degree AV delay (Sallie 238 msec), nonspecific IVCD, early repolarization [de-identified] : 10/18/2021, 5 minutes 30 seconds of Wili protocol (6.5 METS), 85% MPHR, normal myocardial perfusion imaging [de-identified] : 10/18/2021, dilated aortic root at 4.4 cm dilated LA, normal RV size and function, normal LV systolic function, LVEF 55-60%

## 2022-11-21 ENCOUNTER — APPOINTMENT (OUTPATIENT)
Dept: CARDIOLOGY | Facility: CLINIC | Age: 62
End: 2022-11-21

## 2022-11-21 PROCEDURE — A9500: CPT

## 2022-11-21 PROCEDURE — 78452 HT MUSCLE IMAGE SPECT MULT: CPT

## 2022-11-21 PROCEDURE — 99072 ADDL SUPL MATRL&STAF TM PHE: CPT

## 2022-11-21 PROCEDURE — 93306 TTE W/DOPPLER COMPLETE: CPT

## 2022-11-21 PROCEDURE — 93015 CV STRESS TEST SUPVJ I&R: CPT

## 2023-07-13 ENCOUNTER — NON-APPOINTMENT (OUTPATIENT)
Age: 63
End: 2023-07-13

## 2023-09-14 ENCOUNTER — NON-APPOINTMENT (OUTPATIENT)
Age: 63
End: 2023-09-14

## 2023-09-15 ENCOUNTER — APPOINTMENT (OUTPATIENT)
Dept: NEPHROLOGY | Facility: CLINIC | Age: 63
End: 2023-09-15
Payer: COMMERCIAL

## 2023-09-15 ENCOUNTER — LABORATORY RESULT (OUTPATIENT)
Age: 63
End: 2023-09-15

## 2023-09-15 VITALS
OXYGEN SATURATION: 98 % | HEART RATE: 61 BPM | SYSTOLIC BLOOD PRESSURE: 160 MMHG | BODY MASS INDEX: 28.01 KG/M2 | RESPIRATION RATE: 14 BRPM | HEIGHT: 76 IN | DIASTOLIC BLOOD PRESSURE: 88 MMHG | WEIGHT: 230 LBS | TEMPERATURE: 97.3 F

## 2023-09-15 DIAGNOSIS — I10 ESSENTIAL (PRIMARY) HYPERTENSION: ICD-10-CM

## 2023-09-15 DIAGNOSIS — N18.6 END STAGE RENAL DISEASE: ICD-10-CM

## 2023-09-15 DIAGNOSIS — Z99.2 END STAGE RENAL DISEASE: ICD-10-CM

## 2023-09-15 LAB
ABO + RH PNL BLD: NORMAL
ALBUMIN SERPL ELPH-MCNC: 4 G/DL
ALP BLD-CCNC: 546 U/L
ALT SERPL-CCNC: 6 U/L
ANION GAP SERPL CALC-SCNC: 14 MMOL/L
APPEARANCE: CLEAR
AST SERPL-CCNC: 9 U/L
BILIRUB SERPL-MCNC: 0.3 MG/DL
BILIRUBIN URINE: NEGATIVE
BLOOD URINE: ABNORMAL
BUN SERPL-MCNC: 23 MG/DL
C PEPTIDE SERPL-MCNC: 7.2 NG/ML
CALCIUM SERPL-MCNC: 8.2 MG/DL
CHLORIDE SERPL-SCNC: 101 MMOL/L
CHOLEST SERPL-MCNC: 170 MG/DL
CK SERPL-CCNC: 122 U/L
CO2 SERPL-SCNC: 26 MMOL/L
COLOR: YELLOW
COVID-19 SPIKE DOMAIN ANTIBODY INTERPRETATION: POSITIVE
CREAT SERPL-MCNC: 4.42 MG/DL
CRP SERPL-MCNC: 9 MG/L
EGFR: 14 ML/MIN/1.73M2
ERYTHROCYTE [SEDIMENTATION RATE] IN BLOOD BY WESTERGREN METHOD: 55 MM/HR
GLUCOSE QUALITATIVE U: >=1000 MG/DL
GLUCOSE SERPL-MCNC: 255 MG/DL
HDLC SERPL-MCNC: 38 MG/DL
HIV1+2 AB SPEC QL IA.RAPID: NONREACTIVE
KETONES URINE: NEGATIVE MG/DL
LDLC SERPL CALC-MCNC: 98 MG/DL
LEUKOCYTE ESTERASE URINE: NEGATIVE
MAGNESIUM SERPL-MCNC: 2 MG/DL
NITRITE URINE: NEGATIVE
NONHDLC SERPL-MCNC: 132 MG/DL
PH URINE: 7
PHOSPHATE SERPL-MCNC: 4.2 MG/DL
POTASSIUM SERPL-SCNC: 3.9 MMOL/L
PROT SERPL-MCNC: 7 G/DL
PROTEIN URINE: 300 MG/DL
PSA SERPL-MCNC: 1.08 NG/ML
SARS-COV-2 AB SERPL IA-ACNC: >250 U/ML
SODIUM SERPL-SCNC: 141 MMOL/L
SPECIFIC GRAVITY URINE: 1.02
T3 SERPL-MCNC: 98 NG/DL
T4 FREE SERPL-MCNC: 1 NG/DL
TRIGL SERPL-MCNC: 199 MG/DL
TSH SERPL-ACNC: 2.11 UIU/ML
URATE SERPL-MCNC: 3.8 MG/DL
UROBILINOGEN URINE: 1 MG/DL

## 2023-09-15 PROCEDURE — 99213 OFFICE O/P EST LOW 20 MIN: CPT

## 2023-09-22 LAB
ESTIMATED AVERAGE GLUCOSE: >398 MG/DL
HAV IGM SER QL: NONREACTIVE
HBA1C MFR BLD HPLC: >15.5 %
HBV CORE IGG+IGM SER QL: NONREACTIVE
HBV SURFACE AB SER QL: REACTIVE
HBV SURFACE AB SERPL IA-ACNC: 136.1 MIU/ML
HBV SURFACE AG SER QL: NONREACTIVE
HCV AB SER QL: NONREACTIVE
HCV S/CO RATIO: 0.08 S/CO
HSV 1+2 IGG SER IA-IMP: POSITIVE
HSV 1+2 IGG SER IA-IMP: POSITIVE
HSV1 IGG SER QL: 1.81 INDEX
HSV2 IGG SER QL: 5.34 INDEX
M TB IFN-G BLD-IMP: NEGATIVE
QUANTIFERON TB PLUS MITOGEN MINUS NIL: 8.49 IU/ML
QUANTIFERON TB PLUS NIL: 0.01 IU/ML
QUANTIFERON TB PLUS TB1 MINUS NIL: 0 IU/ML
QUANTIFERON TB PLUS TB2 MINUS NIL: 0 IU/ML
RUBV IGG FLD-ACNC: 1.9 INDEX
RUBV IGG SER-IMP: POSITIVE
SARS-COV-2 N GENE NPH QL NAA+PROBE: NOT DETECTED
T PALLIDUM AB SER QL IA: NEGATIVE
VZV AB TITR SER: POSITIVE
VZV IGG SER IF-ACNC: 946.2 INDEX

## 2023-10-06 ENCOUNTER — APPOINTMENT (OUTPATIENT)
Dept: RADIOLOGY | Facility: CLINIC | Age: 63
End: 2023-10-06
Payer: COMMERCIAL

## 2023-10-06 ENCOUNTER — APPOINTMENT (OUTPATIENT)
Dept: CT IMAGING | Facility: CLINIC | Age: 63
End: 2023-10-06
Payer: COMMERCIAL

## 2023-10-06 ENCOUNTER — OUTPATIENT (OUTPATIENT)
Dept: OUTPATIENT SERVICES | Facility: HOSPITAL | Age: 63
LOS: 1 days | End: 2023-10-06
Payer: COMMERCIAL

## 2023-10-06 DIAGNOSIS — Z00.8 ENCOUNTER FOR OTHER GENERAL EXAMINATION: ICD-10-CM

## 2023-10-06 PROCEDURE — 71046 X-RAY EXAM CHEST 2 VIEWS: CPT | Mod: 26

## 2023-10-06 PROCEDURE — 74178 CT ABD&PLV WO CNTR FLWD CNTR: CPT | Mod: 26

## 2023-10-06 PROCEDURE — 71046 X-RAY EXAM CHEST 2 VIEWS: CPT

## 2023-10-06 PROCEDURE — 74178 CT ABD&PLV WO CNTR FLWD CNTR: CPT

## 2023-11-15 ENCOUNTER — APPOINTMENT (OUTPATIENT)
Dept: CARDIOLOGY | Facility: CLINIC | Age: 63
End: 2023-11-15
Payer: COMMERCIAL

## 2023-11-15 ENCOUNTER — NON-APPOINTMENT (OUTPATIENT)
Age: 63
End: 2023-11-15

## 2023-11-15 VITALS
OXYGEN SATURATION: 100 % | SYSTOLIC BLOOD PRESSURE: 162 MMHG | DIASTOLIC BLOOD PRESSURE: 84 MMHG | HEART RATE: 65 BPM | BODY MASS INDEX: 27.27 KG/M2 | WEIGHT: 224 LBS

## 2023-11-15 DIAGNOSIS — I48.0 PAROXYSMAL ATRIAL FIBRILLATION: ICD-10-CM

## 2023-11-15 DIAGNOSIS — Z01.818 ENCOUNTER FOR OTHER PREPROCEDURAL EXAMINATION: ICD-10-CM

## 2023-11-15 PROCEDURE — 99215 OFFICE O/P EST HI 40 MIN: CPT

## 2023-11-15 PROCEDURE — 93000 ELECTROCARDIOGRAM COMPLETE: CPT | Mod: NC

## 2023-11-16 LAB
ALBUMIN SERPL ELPH-MCNC: 4.1 G/DL
ALP BLD-CCNC: 635 U/L
ALT SERPL-CCNC: 9 U/L
ANION GAP SERPL CALC-SCNC: 15 MMOL/L
AST SERPL-CCNC: 10 U/L
BASOPHILS # BLD AUTO: 0.03 K/UL
BASOPHILS NFR BLD AUTO: 0.5 %
BILIRUB SERPL-MCNC: 0.3 MG/DL
BUN SERPL-MCNC: 44 MG/DL
CALCIUM SERPL-MCNC: 8.7 MG/DL
CHLORIDE SERPL-SCNC: 104 MMOL/L
CO2 SERPL-SCNC: 23 MMOL/L
CREAT SERPL-MCNC: 6.54 MG/DL
DEPRECATED KAPPA LC FREE/LAMBDA SER: 2.29 RATIO
EGFR: 9 ML/MIN/1.73M2
EOSINOPHIL # BLD AUTO: 0.24 K/UL
EOSINOPHIL NFR BLD AUTO: 4.1 %
GLUCOSE SERPL-MCNC: 372 MG/DL
HCT VFR BLD CALC: 35.8 %
HGB BLD-MCNC: 10.4 G/DL
IMM GRANULOCYTES NFR BLD AUTO: 0.2 %
KAPPA LC CSF-MCNC: 8.72 MG/DL
KAPPA LC SERPL-MCNC: 20.01 MG/DL
LYMPHOCYTES # BLD AUTO: 1.48 K/UL
LYMPHOCYTES NFR BLD AUTO: 25.3 %
MAN DIFF?: NORMAL
MCHC RBC-ENTMCNC: 21.8 PG
MCHC RBC-ENTMCNC: 29.1 GM/DL
MCV RBC AUTO: 74.9 FL
MONOCYTES # BLD AUTO: 0.6 K/UL
MONOCYTES NFR BLD AUTO: 10.2 %
NEUTROPHILS # BLD AUTO: 3.5 K/UL
NEUTROPHILS NFR BLD AUTO: 59.7 %
PLATELET # BLD AUTO: 286 K/UL
POTASSIUM SERPL-SCNC: 5.3 MMOL/L
PROT SERPL-MCNC: 6.9 G/DL
RBC # BLD: 4.78 M/UL
RBC # FLD: 18 %
SODIUM SERPL-SCNC: 141 MMOL/L
WBC # FLD AUTO: 5.86 K/UL

## 2023-11-21 ENCOUNTER — APPOINTMENT (OUTPATIENT)
Dept: NUCLEAR MEDICINE | Facility: IMAGING CENTER | Age: 63
End: 2023-11-21
Payer: COMMERCIAL

## 2023-11-21 ENCOUNTER — OUTPATIENT (OUTPATIENT)
Dept: OUTPATIENT SERVICES | Facility: HOSPITAL | Age: 63
LOS: 1 days | End: 2023-11-21
Payer: COMMERCIAL

## 2023-11-21 DIAGNOSIS — I48.0 PAROXYSMAL ATRIAL FIBRILLATION: ICD-10-CM

## 2023-11-21 LAB — M PROTEIN SPEC IFE-MCNC: NORMAL

## 2023-11-21 PROCEDURE — A9538: CPT

## 2023-11-21 PROCEDURE — 78830 RP LOCLZJ TUM SPECT W/CT 1: CPT | Mod: 26

## 2023-11-21 PROCEDURE — 78830 RP LOCLZJ TUM SPECT W/CT 1: CPT

## 2024-01-22 ENCOUNTER — APPOINTMENT (OUTPATIENT)
Dept: CARDIOLOGY | Facility: CLINIC | Age: 64
End: 2024-01-22
Payer: COMMERCIAL

## 2024-01-22 PROCEDURE — A9500: CPT

## 2024-01-22 PROCEDURE — 78452 HT MUSCLE IMAGE SPECT MULT: CPT

## 2024-01-22 PROCEDURE — 93015 CV STRESS TEST SUPVJ I&R: CPT

## 2024-01-22 PROCEDURE — 93306 TTE W/DOPPLER COMPLETE: CPT

## 2024-01-22 PROCEDURE — 93356 MYOCRD STRAIN IMG SPCKL TRCK: CPT

## 2024-09-06 ENCOUNTER — NON-APPOINTMENT (OUTPATIENT)
Age: 64
End: 2024-09-06

## 2024-09-06 DIAGNOSIS — Z01.818 ENCOUNTER FOR OTHER PREPROCEDURAL EXAMINATION: ICD-10-CM

## 2024-09-20 ENCOUNTER — NON-APPOINTMENT (OUTPATIENT)
Age: 64
End: 2024-09-20

## 2024-09-20 ENCOUNTER — APPOINTMENT (OUTPATIENT)
Dept: NEPHROLOGY | Facility: CLINIC | Age: 64
End: 2024-09-20

## 2024-09-20 DIAGNOSIS — Z01.818 ENCOUNTER FOR OTHER PREPROCEDURAL EXAMINATION: ICD-10-CM
